# Patient Record
Sex: FEMALE | Race: BLACK OR AFRICAN AMERICAN
[De-identification: names, ages, dates, MRNs, and addresses within clinical notes are randomized per-mention and may not be internally consistent; named-entity substitution may affect disease eponyms.]

---

## 2017-01-19 NOTE — WOMENS IMAGING REPORT
EXAM DESCRIPTION:  BILAT SCREENING MAMMO W/CAD



COMPLETED DATE/TIME:  1/19/2017 11:06 am



REASON FOR STUDY:  Z12.31 ROUTINE SCREENING MAMMO Z12.31  ENCNTR SCREEN MAMMOGRAM FOR MALIGNANT NEOPL
ASM OF ALVA



COMPARISON:  Multiple since 2009



TECHNIQUE:  Standard craniocaudal and mediolateral oblique views of each breast recorded using [employer]
l acquisition.



LIMITATIONS:  None.



FINDINGS:  Findings present which are benign by mammographic criteria.  No suspicious masses, calcifi
cations or architectural distortion.

Read with the assistance of CAD.

.[facility] - R2 Cenova Version 1.3

.[facility] - R2 Cenova Version 1.3

.[facility] - R2 Cenova Version 2.4

.[facility] - R2 Cenova Version 2.4

.[facility] - R2  Version 9.2

Benign mammographic findings may include one or more of the following:  Smooth masses, popcorn/rim/co
arse calcifications, asymmetries, post-procedure changes, and lesions with long-standing stability.



BREAST DENSITY:  c. The breasts are heterogeneously dense, which may obscure small masses.



BIRAD:  2 BENIGN FINDING(S)



RECOMMENDATION:  ROUTINE SCREENING



COMMENT:  PATIENT NOTIFIED BY LETTER.

The American College of Radiology recommends an annual screening mammogram for women aged 40 years or
 over.  Each patient will receive a reminder prior to the anniversary date of her mammogram.

The American College of Radiology (ACR) has developed recommendations for screening MRI of the breast
s in certain patient populations, to be used in conjunction with mammography.  Breast MRI surveillanc
e may be appropriate for women with more than 20% lifetime risk of developing breast cancer  as deter
mined by genetic testing, significant family history of the disease, or history of mantle radiation f
or Hodgkins Disease.  ACR Practice Guidelines 2008.



TECHNICAL DOCUMENTATION:  FINDING NUMBER: (1)

ASSESSMENT: (1)

JOB ID:  [id number]

 2011 [employer]- All Rights Reserved
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
## 2017-01-21 NOTE — ER DOCUMENT REPORT
ED General





- General


Chief Complaint: Back Pain


Stated Complaint: BACK PAIN


Mode of Arrival: Ambulatory


Notes: 


Patient has 2 primary complaints today.





First, she is complaining of a lightheaded feeling for the past couple months, 

off and on.  She's not had any trouble with her balance or walking and has not 

fallen.  Has no losses of consciousness or neurologic deficits.  The 

lightheadedness is worse with head movement and like getting up out of bed.  

Denies having headache.  Denies chest pain.  Has not spoken to her primary care 

provider about this condition.





Patient also complaining of mid back pain that's been going on for a long time.

  Patient has never been told she has any aneurysms or other abnormalities of 

her aorta.  She is seeing a local back doctor/pain management ([facility]) and had 

6 injections in her upper mid back a couple of weeks ago that were very 

successful in helping the pain in that area.  However, last week, she had 4 

injections in the lower mid back area and does not feel that has helped her 

back at all.  Would like a pain medication.





Patient denies any nausea or vomiting.  Denies any UTI symptoms.  No history of 

kidney stones.  Has not had a fever.  No chest pains or irregular heartbeats.  

No shortness of breath.


TRAVEL OUTSIDE OF THE [place] IN LAST 30 DAYS: No





- Related Data


Allergies/Adverse Reactions: 


 





No Known Allergies Allergy (Verified 01/21/17 12:44)


 











Past Medical History





- General


Information source: Patient





- Social History


Smoking Status: Never Smoker


Chew tobacco use (# tins/day): No


Frequency of alcohol use: None


Drug Abuse: None


Family History: Reviewed & Not Pertinent


Patient has suicidal ideation: No


Patient has homicidal ideation: No





- Past Medical History


Cardiac Medical History: Reports: Hx Hypercholesterolemia, Hx Hypertension


   Denies: Hx Coronary Artery Disease, Hx Heart Attack


Pulmonary Medical History: 


   Denies: Hx Asthma, Hx Bronchitis, Hx COPD, Hx Pneumonia


Neurological Medical History: Denies: Hx Cerebrovascular Accident


Endocrine Medical History: Denies: Hx Diabetes Mellitus Type 1, Hx Diabetes 

Mellitus Type 2


Renal/ Medical History: Denies: Hx Peritoneal Dialysis


Musculoskeltal Medical History: Reports Hx Arthritis - Legs


Psychiatric Medical History: 


   Denies: Hx Depression


Past Surgical History: Reports: Hx Appendectomy, Hx Hysterectomy, Hx Orthopedic 

Surgery, Hx Tubal Ligation





- Immunizations


Immunizations up to date: Yes


Hx Diphtheria, Pertussis, Tetanus Vaccination: Yes


Hx Pneumococcal Vaccination: 01/01/08





Review of Systems





- Review of Systems


Notes: 


REVIEW OF SYSTEMS:


CONSTITUTIONAL :  Denies fever.  


EENT:   Denies eye, ear, nose or mouth or throat pain or other symptoms.


CARDIOVASCULAR:  Denies chest pain.


RESPIRATORY:  Denies cough, chest congestion, or shortness of breath.


GASTROINTESTINAL:  Denies abdominal pain or nausea, vomiting, or diarrhea.


GENITOURINARY:  Denies difficulty or painful urinating, urinary frequency, 

blood in urine.


MUSCULOSKELETAL: See history of present illness.


SKIN:   Denies rash or skin lesions.


NEUROLOGICAL:  Denies LOC or altered mental status.  Denies headache.  Denies 

sensory loss or motor deficits.





ALL OTHER SYSTEMS REVIEWED AND NEGATIVE.





Physical Exam





- Vital signs


Vitals: 


 











Temp Pulse Resp BP Pulse Ox


 


 98.7 F   82   20   102/64   97 


 


 01/21/17 12:44  01/21/17 12:44  01/21/17 12:44  01/21/17 12:44  01/21/17 12:44











Interpretation: Normal





- Notes


Notes: 


PHYSICAL EXAMINATION:





GENERAL: Well-appearing, in no acute distress.  Moves about easily without 

pain.  Vital signs are normal.





HEAD: Atraumatic, normocephalic.





EYES: Pupils equal round and reactive to light, extraocular movements intact.





ENT: oropharynx clear without exudates.  Moist mucous membranes.





NECK: Normal range of motion, supple.





LUNGS: Breath sounds clear and equal bilaterally.





HEART: Regular rate and rhythm without murmurs.





ABDOMEN: Soft, nontender.  No guarding or rebound.





BACK: Tender to palpation in the lower thoracic, upper lumbar back and down 

into the lumbar curvature at the pelvis.  All other areas of the back are 

nontender.





EXTREMITIES: Normal range of motion without pain.





NEUROLOGICAL:  Normal speech, normal gait.  Normal sensory, motor, and reflex 

exams.  Awake, alert, and oriented x3.  Cranial nerves normal.





PSYCH: Normal mood, normal affect.





SKIN: Warm, dry, no rashes.





Course





- Re-evaluation


Re-evalutation: 





01/21/17 20:50


Patient says her back responds very well to hydrocodone and she is out of it.





- Vital Signs


Vital signs: 


 











Temp Pulse Resp BP Pulse Ox


 


 98.7 F   82   20   102/64   97 


 


 01/21/17 12:44  01/21/17 12:44  01/21/17 12:44  01/21/17 12:44  01/21/17 12:44














- Laboratory


Result Diagrams: 


 01/21/17 13:20





 01/21/17 13:20


Laboratory results interpreted by me: 


 











  01/21/17 01/21/17





  13:20 13:20


 


WBC  14.8 H 


 


RDW  14.8 H 


 


Absolute Neutrophils  9.4 H 


 


Potassium   3.1 L


 


BUN   23 H


 


AST   47 H














Discharge





- Discharge


Clinical Impression: 


 Lightheaded





Back pain


Qualifiers:


 Back pain location: low back pain Chronicity: chronic Back pain laterality: 

midline Sciatica presence: without sciatica Qualified Code(s): M54.5 - Low back 

pain





Condition: Stable


Disposition: HOME, SELF-CARE


Additional Instructions: 


LIGHT-HEADED OR DIZZINESS:


Under normal circumstances, your sense of balance is controlled by a number of 

signals that your brain receives from several locations:


   Eyes. No matter what your position, visual signals help you determine where 

your body is in space and how it's moving.





   Sensory nerves. These are in your skin, muscles and joints. Sensory nerves 

send messages to your brain about body movements and positions.





   Inner ear. The organ of balance in your inner ear is the vestibular 

labyrinth. It includes loop-shaped structures (semicircular canals) that 

contain fluid and fine, hair-like sensors that monitor the rotation of your 

head. Near the semicircular canals are the utricle and saccule, which contain 

tiny particles called otoconia (o-toe-KOE-nee-uh). These particles are attached 

to sensors that help detect gravity and back-and-forth motion.





Good balance depends on at least two of these three sensory systems working 

well. For instance, closing your eyes while washing your hair in the shower 

doesn't mean you'll lose your balance. Signals from your inner ear and sensory 

nerves help keep you upright.


However, if your central nervous system can't process signals from all of these 

locations, if the messages are contradictory, or if the sensory systems aren't 

functioning properly, you may experience loss of balance.


Dizziness may have a number of potential causes. These may include:


Vertigo


Vertigo - the false sense of motion or spinning - is the most common symptom of 

dizziness. Sitting up or moving around may make it worse. Sometimes vertigo is 

severe enough to cause nausea and vomiting.


Vertigo usually results from a problem with the nerves and the structures of 

the balance mechanism in your inner ear (vestibular system), which sense 

movement and changes in your head position. Abnormal rhythmic eye movements (

nystagmus) almost always accompany vertigo. Causes of vertigo may include:


   Benign paroxysmal positional vertigo (BPPV). BPPV involves intense, brief 

episodes of vertigo associated with a change in the position of your head, 

often when you turn over in bed or sit up in the morning. It occurs when normal 

calcium carbonate crystals (otoconia) break loose and fall into the wrong part 

of the canals in your inner ear. When these particles shift, they stimulate 

sensors in your ear, producing an episode of vertigo. Doctors don't know what 

causes BPPV, but it may be a natural result of aging. Trauma to your head also 

may lead to BPPV.





   Inflammation in the inner ear. Signs and symptoms of inflammation of the 

inner ear (acute vestibular neuronitis or labyrinthitis) include sudden, 

intense vertigo that may persist for several days, with nausea and vomiting. It 

can be incapacitating, requiring bed rest to minimize the signs and symptoms. 

Fortunately, vestibular neuronitis generally subsides and clears up on its own. 

Recovery time may be shorter with vestibular rehabilitation exercises. Although 

the cause of this condition is unknown, it may be a viral infection.





   Meniere's disease. This disease involves the excessive buildup of fluid in 

your inner ear. It may affect adults at any age and is characterized by sudden 

episodes of vertigo lasting 30 minutes to an hour or longer. Other signs and 

symptoms include the feeling of fullness in your ear, buzzing or ringing in 

your ear (tinnitus), and fluctuating hearing loss. The cause of Meniere's 

disease is unknown.





   Vestibular migraine. People who experience a vestibular migraine are very 

sensitive to motion. Dizziness and vertigo caused by a vestibular migraine may 

be triggered by turning your head quickly, being in a crowded or confusing place

, driving or riding in a vehicle, or even watching movement on TV. A vestibular 

migraine may cause feelings of imbalance or unsteadiness, hearing loss, "muffled

" hearing, or ringing in your ears (tinnitus). For most people with a 

vestibular migraine, vertigo doesn't necessarily happen at the same time as the 

headache. Instead, typical migraine triggers may lead to vertigo without an 

actual migraine. Attacks of migrainous vertigo can last from a few minutes to 

several days.





   Acoustic neuroma. An acoustic neuroma (schwannoma) is a noncancerous (benign

) growth on the acoustic nerve, which connects the inner ear to your brain. 

Signs and symptoms of an acoustic neuroma may include dizziness, loss of balance

, hearing loss and tinnitus.





   Rapid changes in motion. Riding on roller coasters or in boats, cars or even 

airplanes may on occasion make you dizzy.





   Other causes. Rarely, vertigo can be a symptom of a more serious 

neurological problem such as a stroke, brain hemorrhage or multiple sclerosis.





Feeling of faintness (presyncope)


"Presyncope" is the medical term for feeling faint and lightheaded without 

losing consciousness. Sometimes nausea, pale skin and a sense of dizziness 

accompany a feeling of faintness. Causes of presyncope include:


   Drop in blood pressure (orthostatic hypotension). A dramatic drop in your 

systolic blood pressure - the higher number in your blood pressure reading - 

may result in lightheadedness or a feeling of faintness. It can occur after 

sitting up or standing too quickly.





   Inadequate output of blood from the heart. Conditions such as partially 

blocked arteries (atherosclerosis), disease of the heart muscle (cardiomyopathy)

, abnormal heart rhythm (arrhythmia) or a decrease in blood volume may cause 

inadequate blood flow from your heart.





Loss of balance (disequilibrium)


Disequilibrium is the loss of balance or the feeling of unsteadiness when you 

walk. Causes may include:


   Inner ear (vestibular) problems. Abnormalities with your inner ear can cause 

you to feel like you are floating, have a heavy head or are unsteady in the 

dark.





   Sensory disorders. Failing vision and nerve damage in your legs (peripheral 

neuropathy) are common in older adultsand may result in difficulty maintaining 

your balance.





   Joint and muscle problems. Muscle weakness and osteoarthritis - the type of 

arthritis that involves wear and tear of your joints - can contribute to loss 

of balance when it involves your weight-bearing joints.





   Medications. Loss of balance can be a side effect of certain medications, 

such as anti-seizure drugs, sedatives and tranquilizers.





Lightheadedness and other kinds of dizziness


Feeling lightheaded is the feeling of being "spaced out" or having the 

sensation of spinning inside your head. It can also give you the sensation that 

if your lightheadedness worsens, you might lose consciousness. Causes may 

include:


   Inner ear disorders. These abnormalities of your inner ear can lead to 

illusions of motion and make you feel like you're floating.





   Anxiety disorders. Certain anxiety disorders, such as panic attacks and a 

fear of leaving home or being in large, open spaces (agoraphobia), may cause 

lightheadedness.





   Hyperventilation. Abnormally rapid breathing that often accompanies anxiety 

disorders may make you feel lightheaded.








NORMAL EXAM AND WORKUP:


     At this time, your examination and workup show no significant abnormality.

  No significant abnormal physical findings were noted.  All laboratory, EKG, 

and imaging (x-ray, CT scans, ultrasound) studies that were ordered show no 

significant abnormality.


     Although your examination and all studies that were ordered showed no 

significant abnormal finding, there are no examinations and no studies that are 

100% accurate.  There is always the possibility that some abnormality could 

exist and not be detected with physical examination or within the limits and 

capabilities of laboratory and other studies.


     You should return or follow up as you were instructed on your visit today 

for further evaluation if your symptoms do not resolve.








FOLLOW-UP CARE:


If you have been referred to a physician for follow-up care, call the physician

s office for an appointment as you were instructed or within the next two days.

  If you experience worsening or a significant change in your symptoms, notify 

the physician immediately or return to the Emergency Department at any time for 

re-evaluation.





Follow-up with your primary care provider for further evaluation of your 

lightheadedness.








BACK PAIN:





     Three out of every four people will have an episode of disabling back pain 

during their lifetime.  Most commonly the pain is due to straining of the 

muscles and ligaments in the low back.


     Usual treatment includes: 


(1) Rest on a firm surface.  Avoid lying on your stomach.  


(2) Ice pack the painful area.  After a few days, gentle heat may be used 

intermittently to relax the area, or ice packs can be continued.  


(3) Medication may be needed -- muscle relaxers and antiinflammatory medicines 

are commonly used.  


(4) As the back improves, exercises are prescribed to strengthen the back and 

abdominal muscles.


     Your doctor will advise you on the proper care for your back at each stage 

in your recovery.  You may be better in a few days -- or healing may take 

several weeks.


     If new symptoms of a "herniated disc" (radiation of pain, numbness, or 

tingling down the back of the leg or weakness in the leg) occur, you should be 

re-examined.  Further testing may be necessary.








ORAL NARCOTIC MEDICATION:


     You have been given a prescription for pain control.  This medication is a 

narcotic.  It's best taken with food, as nausea can result if taken on an empty 

stomach.


     Don't operate machinery or drive within six hours of taking this 

medication.  Do not combine this medicine with alcohol, or with any medication 

which can cause sedation (such as cold tablets or sleeping pills) unless you 

get permission from the physician.


     Narcotics tend to cause constipation.  If possible, drink plenty of fluids 

and eat a diet high in fiber and fruits.





WARM PACKS:


     After approximately two days, apply gentle heat (such as a heating pad or 

hot water bottle) for about 20 to 30 minutes about every two hours -- at least 

four times daily.  Warmth and elevation will help you make a more rapid recovery

, and will ease the pain considerably.


     Do not use HOT heat, and never apply heat for longer than 30 minutes.  The 

continuous heat can invisibly damage skin and muscles -- even when no burn is 

seen on the surface.  Damaged muscles can make you MORE sore.








FOLLOW-UP CARE:


If you have been referred to a physician for follow-up care, call the physician

s office for an appointment as you were instructed or within the next two days.

  If you experience worsening or a significant change in your symptoms, notify 

the physician immediately or return to the Emergency Department at any time for 

re-evaluation.





Follow-up with the doctor who is seeing you for your back pain and doing the 

injections in your back.  You may need to have another set of injections for 

this pain.











Prescriptions: 


Hydrocodone/Acetaminophen [Norco 5-325 mg Tablet] 1 tab PO Q6HP PRN #15 tablet


 PRN Reason: 


Referrals: 


KAREN HARVEY MD [Primary Care Provider] - Follow up as needed

## 2017-02-15 NOTE — EKG REPORT
SEVERITY:- ABNORMAL ECG -

SINUS RHYTHM

LEFT ATRIAL ABNORMALITY

:

Confirmed by: Uyen Allen 15-Feb-2017 21:34:49
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
## 2017-02-15 NOTE — ER DOCUMENT REPORT
ED General





- General


Chief Complaint: Chest Pain


Stated Complaint: CHEST PAIN/LEFT SIDE PAIN


Notes: 


Patient is a 66-year-old female with past medical history of obesity and 

hyperlipidemia who presents with an episode of left-sided chest pain that 

occurred last night but has mostly resolved since that time.  States she's had 

similar symptoms in the past with musculoskeletal spasms.  Denies any pain at 

the time my assessment.  States that the pain was there last night it was a dull

, aching, cramping pain.  It has spontaneously resolved but she wanted to be 

sure that it was not her heart and so came to the emergency department for 

further evaluation.  She has not seeing her primary care physician regarding 

today's concerns.  Nothing improves or worsens or symptoms.  She denies any 

prior cardiac history.  Denies any dyspnea, diaphoresis, vomiting. No history 

of DVT or pulmonary embolus.


TRAVEL OUTSIDE OF THE [place] IN LAST 30 DAYS: No





- Related Data


Allergies/Adverse Reactions: 


 





No Known Allergies Allergy (Verified 01/21/17 12:44)


 











Past Medical History





- General


Information source: Patient





- Social History


Smoking Status: Never Smoker


Frequency of alcohol use: Rare


Drug Abuse: None


Lives with: Spouse/Significant other


Family History: Reviewed & Not Pertinent


Patient has suicidal ideation: No


Patient has homicidal ideation: No





- Past Medical History


Cardiac Medical History: Reports: Hx Hypercholesterolemia, Hx Hypertension


   Denies: Hx Coronary Artery Disease, Hx Heart Attack


Pulmonary Medical History: 


   Denies: Hx Asthma, Hx Bronchitis, Hx COPD, Hx Pneumonia


Neurological Medical History: Denies: Hx Cerebrovascular Accident


Endocrine Medical History: Denies: Hx Diabetes Mellitus Type 1, Hx Diabetes 

Mellitus Type 2


Renal/ Medical History: Denies: Hx Peritoneal Dialysis


Musculoskeltal Medical History: Reports Hx Arthritis - Legs


Psychiatric Medical History: 


   Denies: Hx Depression


Past Surgical History: Reports: Hx Appendectomy, Hx Hysterectomy, Hx Orthopedic 

Surgery, Hx Tubal Ligation





- Immunizations


Immunizations up to date: Yes


Hx Diphtheria, Pertussis, Tetanus Vaccination: Yes


Hx Pneumococcal Vaccination: 01/01/16





Review of Systems





- Review of Systems


Notes: 


Constitutional: Negative for fever.


HENT: Negative for sore throat.


Eyes: Negative for visual changes.


Cardiovascular: Negative for chest pain.


Respiratory: Negative for shortness of breath.


Gastrointestinal: Negative for abdominal pain, vomiting or diarrhea.


Genitourinary: Negative for dysuria.


Musculoskeletal: Positive for chest wall discomfort


Skin: Negative for rash.


Neurological: Negative for headaches, weakness or numbness.





10 point ROS negative except as marked above and in HPI.





Physical Exam





- Vital signs


Vitals: 


 











Temp Pulse Resp BP Pulse Ox


 


 98.4 F   82   16   123/62   97 


 


 02/15/17 19:43  02/15/17 19:43  02/15/17 19:43  02/15/17 19:43  02/15/17 19:43











Interpretation: Normal


Notes: 


PHYSICAL EXAMINATION:





GENERAL: Well-appearing, well-nourished and in no acute distress.





HEAD: Atraumatic, normocephalic.





EYES: Pupils equal round and reactive to light, extraocular movements intact, 

sclera anicteric, conjunctiva are normal.





ENT: nares patent, oropharynx clear without exudates.  Moist mucous membranes.





NECK: Normal range of motion, supple without lymphadenopathy





LUNGS: Breath sounds clear to auscultation bilaterally and equal.  No wheezes 

rales or rhonchi.





HEART: Regular rate and rhythm without murmurs





ABDOMEN: Soft, nontender, normoactive bowel sounds.  No guarding, no rebound.  

No masses appreciated.





EXTREMITIES: Normal range of motion, no pitting or edema.  No cyanosis.  Pain 

on elevation of the lateral most aspect of the left sub-clavicle region





NEUROLOGICAL: No focal neurological deficits. Moves all extremities 

spontaneously and on command.





PSYCH: Normal mood, normal affect.





SKIN: Warm, Dry, normal turgor, no rashes or lesions noted.





Course





- Re-evaluation


Re-evalutation: 





02/15/17 23:02


Presentation of chest pain in an otherwise well appearing patient. Low clinical 

suspicion for ACS given clinical history, exam, EKG without ST elevations or 

depressions, and negative initial troponin. No indication for serial troponin 

as patient had her episode of pain last night and has been pain free since that 

time. HEART score less than or equal to 3. PE also seems unlikely given 

clinical history, absence of tachycardia or dyspnea.  Well's score 0. CXR 

without evidence of pneumothorax or pneumonia. No widened mediastinum. Aortic 

dissection also seems unlikely given history, symmetric pulses, CXR, and 

vitals. Given reassuring evaluation, will plan for discharge home at this time 

with return precautions and follow-up recommendations.





- Vital Signs


Vital signs: 


 











Temp Pulse Resp BP Pulse Ox


 


 98.4 F   79   16   123/71   96 


 


 02/15/17 19:43  02/15/17 23:12  02/15/17 23:12  02/15/17 23:12  02/15/17 23:12














- Laboratory


Result Diagrams: 


 02/15/17 19:55





 02/15/17 19:55


Laboratory results interpreted by me: 


 











  02/15/17 02/15/17





  19:55 19:55


 


Hgb  11.7 L 


 


Hct  35.0 L 


 


RDW  14.2 H 


 


Potassium   3.2 L


 


Creatine Kinase   186 H














- Diagnostic Test


Radiology reviewed: Image reviewed, Reports reviewed


Radiology results interpreted by me: 





02/15/17 23:03


Chest x-ray: No acute infiltrate





- EKG Interpretation by Me


Additional EKG results interpreted by me: 





02/15/17 23:03


Normal sinus rhythm.  Rate 81.  No ST elevations or depressions.  QTC is 465.





Discharge





- Discharge


Clinical Impression: 


 Chest wall pain





Condition: Good


Disposition: HOME, SELF-CARE


Additional Instructions: 


You were seen today for chest pain.  The exact cause of your pain is unclear. 

However, based on your cardiac enzyme testing, chest x-ray, and EKG it does not 

appear that it is from an immediately life-threatening cause at this time.  

Although your testing here is normal is critical that you follow-up with your 

primary care physician for continued evaluation of this chest pain and possible 

stress testing.  I recommended you see your physician within the next 24-48 

hours to be evaluated for consideration of a stress test.  Please return to 

emergency department immediately if you have worsening of your chest pain, 

shortness of breath, vomiting, become unable to exert yourself due to pain or 

difficulty breathing, you pass out, or have any pain that radiates into your 

arms, jaw, or back. Please also return if you have any additional symptoms that 

are concerning to you.


Referrals: 


KAREN HARVEY MD [Primary Care Provider] - Follow up as needed

## 2018-03-29 ENCOUNTER — HOSPITAL ENCOUNTER (OUTPATIENT)
Dept: HOSPITAL 62 - OD | Age: 68
End: 2018-03-29
Attending: FAMILY MEDICINE
Payer: MEDICARE

## 2018-03-29 DIAGNOSIS — M25.50: Primary | ICD-10-CM

## 2018-03-29 PROCEDURE — 86430 RHEUMATOID FACTOR TEST QUAL: CPT

## 2018-03-29 PROCEDURE — 86140 C-REACTIVE PROTEIN: CPT

## 2018-03-29 PROCEDURE — 36415 COLL VENOUS BLD VENIPUNCTURE: CPT

## 2018-03-29 PROCEDURE — 86038 ANTINUCLEAR ANTIBODIES: CPT

## 2018-03-29 PROCEDURE — 85652 RBC SED RATE AUTOMATED: CPT

## 2018-04-24 ENCOUNTER — HOSPITAL ENCOUNTER (OUTPATIENT)
Dept: HOSPITAL 62 - WI | Age: 68
End: 2018-04-24
Payer: MEDICARE

## 2018-04-24 DIAGNOSIS — Z12.31: Primary | ICD-10-CM

## 2018-04-24 PROCEDURE — 77067 SCR MAMMO BI INCL CAD: CPT

## 2018-04-24 PROCEDURE — 77063 BREAST TOMOSYNTHESIS BI: CPT

## 2018-04-24 NOTE — WOMENS IMAGING REPORT
EXAM DESCRIPTION:  3D SCREENING MAMMO BILAT



COMPLETED DATE/TIME:  4/24/2018 12:01 pm



REASON FOR STUDY:  ROUTINE SCREENING;Z12.31 Z12.31  ENCNTR SCREEN MAMMOGRAM FOR MALIGNANT NEOPLASM OF
 ALVA



COMPARISON:  [id number]



TECHNIQUE:  Standard craniocaudal and mediolateral oblique views of each breast recorded using digita
l acquisition and breast tomosynthesis.



LIMITATIONS:  None.



FINDINGS:  No masses, calcifications or architectural distortion. No areas of suspicion.

Read with the assistance of CAD.

.[facility] - R2 Cenova Version 1.3

.[facility] - R2 Cenova Version 1.3

.[facility] - R2 Cenova Version 2.4

[place] [place] Version 2.4

.[facility] - R2  Version 9.2



IMPRESSION:  NORMAL MAMMOGRAM.  BIRADS 1.



BREAST DENSITY:  b. There are scattered areas of fibroglandular density.



BIRAD:  1 NEGATIVE



RECOMMENDATION:  ROUTINE SCREENING



COMMENT:  The patient has been notified of the results by letter per SA requirements. Additional no
tification policies are in place for contacting patient with suspicious or incomplete findings.

Quality ID #225: The American College of Radiology recommends an annual screening mammogram for women
 aged 40 years or over. This facility utilizes a reminder system to ensure that all patients receive 
reminder letters, and/or direct phone calls for appointments. This includes reminders for routine scr
eening mammograms, diagnostic mammograms, or other Breast Imaging Interventions when appropriate.  Th
is patient will be placed in the appropriate reminder system.

The American College of Radiology (ACR) has developed recommendations for screening MRI of the breast
s in certain patient populations, to be used in conjunction with mammography.  Breast MRI surveillanc
e may be appropriate for women with more than 20% lifetime risk of developing breast cancer  as deter
mined by genetic testing, significant family history of the disease, or history of mantle radiation f
or Hodgkins Disease.  ACR Practice Guidelines 2008.

[employer]



PQRS 6045F: Fluoroscopic imaging is not utilized for breast tomosynthesis.



TECHNICAL DOCUMENTATION:  FINDING NUMBER: (1)

ASSESSMENT:  (1)

JOB ID:  [id number]

 2011 [employer]- All Rights Reserved



Reading location - IP/workstation name: [email or web address]

## 2018-11-02 ENCOUNTER — HOSPITAL ENCOUNTER (OUTPATIENT)
Dept: HOSPITAL 62 - OD | Age: 68
End: 2018-11-02
Attending: INTERNAL MEDICINE
Payer: MEDICARE

## 2018-11-02 DIAGNOSIS — E78.5: Primary | ICD-10-CM

## 2018-11-06 ENCOUNTER — HOSPITAL ENCOUNTER (OUTPATIENT)
Dept: HOSPITAL 62 - OD | Age: 68
End: 2018-11-06
Attending: INTERNAL MEDICINE
Payer: MEDICARE

## 2018-11-06 DIAGNOSIS — E78.5: Primary | ICD-10-CM

## 2018-11-06 LAB
ALBUMIN SERPL-MCNC: 4.3 G/DL (ref 3.5–5)
ALP SERPL-CCNC: 72 U/L (ref 38–126)
ALT SERPL-CCNC: 17 U/L (ref 9–52)
ANION GAP SERPL CALC-SCNC: 13 MMOL/L (ref 5–19)
AST SERPL-CCNC: 30 U/L (ref 14–36)
BILIRUB DIRECT SERPL-MCNC: 0.1 MG/DL (ref 0–0.4)
BILIRUB SERPL-MCNC: 0.3 MG/DL (ref 0.2–1.3)
BUN SERPL-MCNC: 11 MG/DL (ref 7–20)
CALCIUM: 9.7 MG/DL (ref 8.4–10.2)
CHLORIDE SERPL-SCNC: 101 MMOL/L (ref 98–107)
CO2 SERPL-SCNC: 29 MMOL/L (ref 22–30)
GLUCOSE SERPL-MCNC: 101 MG/DL (ref 75–110)
POTASSIUM SERPL-SCNC: 3.5 MMOL/L (ref 3.6–5)
PROT SERPL-MCNC: 7.7 G/DL (ref 6.3–8.2)
SODIUM SERPL-SCNC: 142.5 MMOL/L (ref 137–145)

## 2018-11-06 PROCEDURE — 36415 COLL VENOUS BLD VENIPUNCTURE: CPT

## 2018-11-06 PROCEDURE — 80053 COMPREHEN METABOLIC PANEL: CPT

## 2018-12-07 ENCOUNTER — HOSPITAL ENCOUNTER (OUTPATIENT)
Dept: HOSPITAL 62 - OD | Age: 68
End: 2018-12-07
Attending: INTERNAL MEDICINE
Payer: MEDICARE

## 2018-12-07 DIAGNOSIS — I10: Primary | ICD-10-CM

## 2018-12-07 LAB
ANION GAP SERPL CALC-SCNC: 14 MMOL/L (ref 5–19)
BUN SERPL-MCNC: 11 MG/DL (ref 7–20)
CALCIUM: 10 MG/DL (ref 8.4–10.2)
CHLORIDE SERPL-SCNC: 95 MMOL/L (ref 98–107)
CO2 SERPL-SCNC: 34 MMOL/L (ref 22–30)
GLUCOSE SERPL-MCNC: 130 MG/DL (ref 75–110)
POTASSIUM SERPL-SCNC: 2.9 MMOL/L (ref 3.6–5)
SODIUM SERPL-SCNC: 143.1 MMOL/L (ref 137–145)

## 2018-12-07 PROCEDURE — 80048 BASIC METABOLIC PNL TOTAL CA: CPT

## 2018-12-07 PROCEDURE — 36415 COLL VENOUS BLD VENIPUNCTURE: CPT

## 2019-01-29 ENCOUNTER — HOSPITAL ENCOUNTER (OUTPATIENT)
Dept: HOSPITAL 62 - OD | Age: 69
End: 2019-01-29
Attending: INTERNAL MEDICINE
Payer: MEDICARE

## 2019-01-29 DIAGNOSIS — R73.9: Primary | ICD-10-CM

## 2019-01-29 LAB — FASTING GAC: 129 (ref ?–110)

## 2019-01-29 PROCEDURE — 82951 GLUCOSE TOLERANCE TEST (GTT): CPT

## 2019-01-29 PROCEDURE — 36415 COLL VENOUS BLD VENIPUNCTURE: CPT

## 2019-11-20 ENCOUNTER — HOSPITAL ENCOUNTER (EMERGENCY)
Dept: HOSPITAL 62 - ER | Age: 69
Discharge: HOME | End: 2019-11-20
Payer: MEDICARE

## 2019-11-20 VITALS — DIASTOLIC BLOOD PRESSURE: 62 MMHG | SYSTOLIC BLOOD PRESSURE: 116 MMHG
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
## 2019-11-20 DIAGNOSIS — M54.9: ICD-10-CM

## 2019-11-20 DIAGNOSIS — S09.90XA: ICD-10-CM

## 2019-11-20 DIAGNOSIS — I10: ICD-10-CM

## 2019-11-20 DIAGNOSIS — S29.012A: Primary | ICD-10-CM

## 2019-11-20 DIAGNOSIS — Z87.891: ICD-10-CM
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
## 2019-11-20 DIAGNOSIS — R51: ICD-10-CM

## 2019-11-20 PROCEDURE — 72110 X-RAY EXAM L-2 SPINE 4/>VWS: CPT

## 2019-11-20 PROCEDURE — 70450 CT HEAD/BRAIN W/O DYE: CPT

## 2019-11-20 PROCEDURE — 99284 EMERGENCY DEPT VISIT MOD MDM: CPT

## 2019-11-20 PROCEDURE — 72070 X-RAY EXAM THORAC SPINE 2VWS: CPT

## 2019-11-20 NOTE — RADIOLOGY REPORT (SQ)
EXAM DESCRIPTION:  L SPINE WHOLE



COMPLETED DATE/TIME:  11/20/2019 5:12 pm



REASON FOR STUDY:  fall



COMPARISON:  Lumbar spine five views 3/7/2013



NUMBER OF VIEWS:  Five views including obliques.



TECHNIQUE:  AP, lateral, oblique, and sacral radiographic images acquired of the lumbar spine.



LIMITATIONS:  None.



FINDINGS:  MINERALIZATION: Normal.

SEGMENTATION: 6 lumbar vertebral bodies.

ALIGNMENT: Normal.

VERTEBRAE: Maintained height.  No fracture or worrisome bone lesion.

DISCS: Preserved height.  No significant osteophytes or end plate irregularity.

POSTERIOR ELEMENTS: Pedicles and facets are intact.  No pars defect or posterior arch defects.  Lower
 lumbar facet arthropathy

HARDWARE: None in the spine.

PARASPINAL SOFT TISSUES: Normal.

PELVIS: Bilateral SI joint sclerosis

OTHER: No other significant finding.



IMPRESSION:  No acute findings.



TECHNICAL DOCUMENTATION:  JOB ID:  [id number]

 2011 [employer]- All Rights Reserved



Reading location - IP/workstation name: [email or web address]

## 2019-11-20 NOTE — ER DOCUMENT REPORT
Entered by VERONICA KRAUS SCRIBE  11/20/19 1804 





Acting as scribe for:FRANCES NUR DO





ED Fall





- General


Chief Complaint: Fall


Stated Complaint: FALL/BODY PAIN


Time Seen by Provider: 11/20/19 16:28


Primary Care Provider: 


KAREN HARVEY MD [Primary Care Provider] - Follow up tomorrow


Mode of Arrival: Ambulatory


Information source: Patient


Notes: 





This 68-year-old female patient presents to the emergency department today with 

complaints of back pain.  Patient states about 5 days ago she tripped over a 

concrete divider and fell backwards onto on the pavement. Patient has had low 

back pain ever since. Patient did not lose consciousness. Patient has been able 

to walk since this without any difficulty.


TRAVEL OUTSIDE OF THE [place] IN LAST 30 DAYS: No





- Related data


Allergies/Adverse Reactions: 


                                        





No Known Allergies Allergy (Verified 11/20/19 16:25)


   











Past Medical History





- General


Information source: Patient





- Social History


Smoking Status: Former Smoker


Cigarette use (# per day): No


Chew tobacco use (# tins/day): No


Frequency of alcohol use: Occasional


Drug Abuse: None


Family History: Reviewed & Not Pertinent


Patient has suicidal ideation: No


Patient has homicidal ideation: No





- Past Medical History


Cardiac Medical History: Reports: Hx Hypercholesterolemia, Hx Hypertension


Musculoskeletal Medical History: Reports Hx Arthritis - Legs


Past Surgical History: Reports: Hx Appendectomy, Hx Hysterectomy, Hx Orthopedic 

Surgery, Hx Tubal Ligation





- Immunizations


Immunizations up to date: Yes


Hx Diphtheria, Pertussis, Tetanus Vaccination: Yes


Hx Pneumococcal Vaccination: 01/01/16





Review of Systems





- Review of Systems


Constitutional: No symptoms reported


EENT: No symptoms reported


Cardiovascular: No symptoms reported


Respiratory: No symptoms reported


Gastrointestinal: No symptoms reported


Genitourinary: No symptoms reported


Female Genitourinary: No symptoms reported


Musculoskeletal: See HPI, Back pain


Skin: No symptoms reported


Hematologic/Lymphatic: No symptoms reported


Neurological/Psychological: No symptoms reported


-: Yes All other systems reviewed and negative





Physical Exam





- Vital signs


Vitals: 


                                        











Temp Pulse Resp BP Pulse Ox


 


 98.2 F   100   18   127/70 H  95 


 


 11/20/19 15:55  11/20/19 15:55  11/20/19 15:55  11/20/19 15:55  11/20/19 15:55











Interpretation: Normal





- General


General appearance: Appears well, Alert





- HEENT


Head: Normocephalic, Atraumatic


Eyes: Normal


Pupils: PERRL


Neck: Other - Mild left sided trapezius tenderness to palpation in the base of 

the neck.  No midline tenderness





- Respiratory


Respiratory status: No respiratory distress


Chest status: Nontender


Breath sounds: Normal


Chest palpation: Normal





- Cardiovascular


Rhythm: Regular


Heart sounds: Normal auscultation


Murmur: No





- Abdominal


Inspection: Normal


Distension: No distension


Bowel sounds: Normal


Tenderness: Nontender


Organomegaly: No organomegaly





- Back


Back: Normal, Tender - Mild tenderness to palpation paraspinal muscles midline 

back and over sacrum.





- Extremities


General upper extremity: Normal inspection, Nontender, Normal color, Normal ROM,

Normal temperature


General lower extremity: Normal inspection, Nontender, Normal color, Normal ROM,

Normal temperature, Normal weight bearing.  No: Praveena's sign





- Neurological


Neuro grossly intact: Yes


Cognition: Normal


Orientation: AAOx4


Mitesh Coma Scale Eye Opening: Spontaneous


Mitesh Coma Scale Verbal: Oriented


Dulzura Coma Scale Motor: Obeys Commands


Mitesh Coma Scale Total: 15


Speech: Normal


Motor strength normal: LUE, RUE, LLE, RLE


Sensory: Normal





- Psychological


Associated symptoms: Normal affect, Normal mood





- Skin


Skin Temperature: Warm


Skin Moisture: Dry


Skin Color: Normal





Course





- Re-evaluation


Re-evalutation: 





11/20/19


Patient is a 68-year-old female who fell comes in for headache and back pain.  

CT head within normal limits.  No acute findings on x-ray.  Patient is 

neurovascularly intact.  She will be discharged home with Lidoderm and is to 

follow-up with her doctor.  Stable for discharge.  Return if any worsening or 

concerning symptoms.  Understands and agrees with plan.








- Vital Signs


Vital signs: 


                                        











Temp Pulse Resp BP Pulse Ox


 


 97.9 F   90   16   116/62   95 


 


 11/20/19 18:36  11/20/19 18:36  11/20/19 18:36  11/20/19 18:36  11/20/19 18:36














- Diagnostic Test


Radiology reviewed: Reports reviewed





Discharge





- Discharge


Clinical Impression: 


Fall


Qualifiers:


 Encounter type: initial encounter Qualified Code(s): W19.XXXA - Unspecified 

fall, initial encounter





Closed head injury


Qualifiers:


 Encounter type: initial encounter Qualified Code(s): S09.90XA - Unspecified 

injury of head, initial encounter





Upper back strain


Qualifiers:


 Encounter type: initial encounter Qualified Code(s): S29.012A - Strain of 

muscle and tendon of back wall of thorax, initial encounter





Back contusion


Qualifiers:


 Encounter type: initial encounter Laterality: unspecified laterality Qualified 

Code(s): S20.229A - Contusion of unspecified back wall of thorax, initial 

encounter





Condition: Stable


Disposition: HOME, SELF-CARE


Instructions:  Contusion ([facility]), Head Injury Precautions ([facility]), Upper Back Strain

([facility])


Additional Instructions: 


Please use a cane or walker until you feel like your balance is back to normal.


Prescriptions: 


Lidocaine [Lidoderm 5% (700 mg) Transdermal Patch] 1 patch TP DAILY #14 

adh..patch


Referrals: 


KAREN HARVEY MD [Primary Care Provider] - Follow up tomorrow





I personally performed the services described in the documentation, reviewed and

edited the documentation which was dictated to the scribe in my presence, and it

accurately records my words and actions.

## 2019-11-20 NOTE — RADIOLOGY REPORT (SQ)
EXAM DESCRIPTION:  CT HEAD WITHOUT



COMPLETED DATE/TIME:  11/20/2019 5:01 pm



REASON FOR STUDY:  fall



COMPARISON:  5/1/2016



TECHNIQUE:  Axial images acquired through the brain without intravenous contrast. Images reviewed wit
h bone, brain and subdural windows.  Images stored on PACS.

All CT scanners at this facility use dose modulation, iterative reconstruction, and/or weight based d
osing when appropriate to reduce radiation dose to as low as reasonably achievable (ALARA).

[facility]: Dose Right  [facility]: CareDose    [facility]: Dose Right    [facility]: Teradose 4D    [facility]: Smart Lucidity (Member[employer])



RADIATION DOSE:  CT Rad equipment meets quality standard of care and radiation dose reduction techniq
ues were employed. CTDIvol: 53.2 mGy. DLP: 1070 mGy-cm..



LIMITATIONS:  None.



FINDINGS:  VENTRICLES: Normal size and contour.

CEREBRUM: No masses. No hemorrhage. No midline shift. Age appropriate white matter. No evidence for a
cute infarction.

CEREBELLUM: No masses. No hemorrhage. No alteration of density. No evidence for acute infarction.

EXTRA-AXIAL SPACES: No fluid collections.

ORBITS AND GLOBE: No intra- or extraconal masses. Normal contour of globe without masses.

CALVARIUM: No fracture.

PARANASAL SINUSES: No fluid or mucosal thickening.

SOFT TISSUES: No mass or hematoma.

OTHER: No other significant finding.



IMPRESSION:  NO ACUTE INTRACRANIAL FINDINGS.

EVIDENCE OF ACUTE STROKE: NO.



TECHNICAL DOCUMENTATION:  JOB ID:  [id number]

[id number]

Quality ID # 436: Final reports with documentation of one or more dose reduction techniques (e.g., Au
tomated exposure control, adjustment of the mA and/or kV according to patient size, use of iterative 
reconstruction technique)

 2011 [employer]- All Rights Reserved



Reading location - IP/workstation name: [email or web address]

## 2019-11-20 NOTE — RADIOLOGY REPORT (SQ)
EXAM DESCRIPTION:  T SPINE AP/LAT



COMPLETED DATE/TIME:  11/20/2019 5:12 pm



REASON FOR STUDY:  fall



COMPARISON:  Lumbar spine films same date



NUMBER OF VIEWS:  Two views.



TECHNIQUE:  AP and lateral radiographic images acquired of the thoracic spine.



LIMITATIONS:  None.



FINDINGS:  MINERALIZATION: Normal.

ALIGNMENT: Normal.  No scoliosis.

VERTEBRAE: No fracture or bone lesion.  Maintained height, normal segmentation.

DISCS: No significant loss of height or significant narrowing.  No large osteophytes.

HARDWARE: None in the spine.

MEDIASTINUM AND SOFT TISSUES: Normal heart size and aortic contour.  No soft tissue abnormality.

VISUALIZED LUNG FIELDS: Clear.

OTHER: No other significant finding.



IMPRESSION:  No acute findings



TECHNICAL DOCUMENTATION:  JOB ID:  [id number]

 2011 [employer]- All Rights Reserved



Reading location - IP/workstation name: [email or web address]

## 2019-11-20 NOTE — ER DOCUMENT REPORT
ED Medical Screen (RME)





- General


Chief Complaint: Fall


Stated Complaint: FALL/BODY PAIN


Time Seen by Provider: 11/20/19 16:28


Primary Care Provider: 


KAREN HARVEY MD [Primary Care Provider] - Follow up as needed


Notes: 





Patient is a 68-year-old female who presents the emergency department after 

fall.  Patient states that she fell this past Saturday.  Patient states that she

was getting groceries and was in the parking lot.  She ended up tripping over a 

cement parking space block and fell down.  She denies any loss of consciousness.

 She is not sure if she had hit her head.  Patient states that she takes hyd

rocodone at home and it helps a little bit with her pain, but states that she 

continues to hurt.  Has complaints of entire back pain.  Denies any numbness or 

tingling.





Exam: Patient moves all extremities with no difficulty.  Strength 5 out of 5.





I have greeted and performed a rapid initial assessment of this patient.  A 

comprehensive ED assessment and evaluation of the patient, analysis of test 

results and completion of medical decision making process will be conducted by 

an additional ED providers.





- Related Data


Allergies/Adverse Reactions: 


                                        





No Known Allergies Allergy (Verified 11/20/19 16:25)


   











Past Medical History





- Social History


Frequency of alcohol use: Occasional


Drug Abuse: None





- Past Medical History


Cardiac Medical History: Reports: Hx Hypercholesterolemia, Hx Hypertension


   Denies: Hx Coronary Artery Disease, Hx Heart Attack


Pulmonary Medical History: 


   Denies: Hx Asthma, Hx Bronchitis, Hx COPD, Hx Pneumonia


Neurological Medical History: Denies: Hx Cerebrovascular Accident


Endocrine Medical History: Denies: Hx Diabetes Mellitus Type 1, Hx Diabetes 

Mellitus Type 2


Renal/ Medical History: Denies: Hx Peritoneal Dialysis


Musculoskeltal Medical History: Reports Hx Arthritis - Legs


Psychiatric Medical History: 


   Denies: Hx Depression


Past Surgical History: Reports: Hx Appendectomy, Hx Hysterectomy, Hx Orthopedic 

Surgery, Hx Tubal Ligation





- Immunizations


Immunizations up to date: Yes


Hx Diphtheria, Pertussis, Tetanus Vaccination: Yes





Physical Exam





- Vital signs


Vitals: 





                                        











Temp Pulse Resp BP Pulse Ox


 


 98.2 F   100   18   127/70 H  95 


 


 11/20/19 15:55  11/20/19 15:55  11/20/19 15:55  11/20/19 15:55  11/20/19 15:55














Course





- Vital Signs


Vital signs: 





                                        











Temp Pulse Resp BP Pulse Ox


 


 98.2 F   100   18   127/70 H  95 


 


 11/20/19 15:55  11/20/19 15:55  11/20/19 15:55  11/20/19 15:55  11/20/19 15:55














Doctor's Discharge





- Discharge


Referrals: 


KAREN HARVEY MD [Primary Care Provider] - Follow up as needed

## 2020-01-14 ENCOUNTER — HOSPITAL ENCOUNTER (OUTPATIENT)
Dept: HOSPITAL 62 - WI | Age: 70
End: 2020-01-14
Attending: INTERNAL MEDICINE
Payer: MEDICARE

## 2020-01-14 DIAGNOSIS — Z12.31: Primary | ICD-10-CM

## 2020-01-14 PROCEDURE — 77067 SCR MAMMO BI INCL CAD: CPT

## 2020-01-14 PROCEDURE — 77063 BREAST TOMOSYNTHESIS BI: CPT

## 2020-01-15 NOTE — WOMENS IMAGING REPORT
EXAM DESCRIPTION:  3D SCREENING MAMMO BILAT



COMPLETED DATE/TIME:  1/14/2020 1:57 pm



REASON FOR STUDY:  Z12.31 ENCOUNTER FOR SCREENING MAMMOGRAM FOR MALIGNANT NEOPLASM OF BREAST Z12.31  
ENCNTR SCREEN MAMMOGRAM FOR MALIGNANT NEOPLASM OF ALVA



COMPARISON:  Multiple since 2009



EXAM PARAMETERS:  Views: Standard craniocaudal and mediolateral oblique views of each breast recorded
 using digital acquisition and breast tomosynthesis.

Read with the assistance of CAD.

.[facility] - swabr  Version 9.2



LIMITATIONS:  None.



FINDINGS:  No suspicious masses, suspicious calcifications or architectural distortion. No areas of c
oncern.



IMPRESSION:   NEGATIVE MAMMOGRAM. BIRADS 1.



BREAST DENSITY:  b. There are scattered areas of fibroglandular density.



BIRAD:  ASSESSMENT:  1 NEGATIVE



RECOMMENDATION:  ROUTINE SCREENING

Please continue yearly bilateral screening mammography/tomosynthesis in January 2021



COMMENT:  The patient has been notified of the results by letter per MQSA requirements. Additional no
tification policies are in place for contacting patient with suspicious or incomplete findings.

Quality ID #225: The American College of Radiology recommends an annual screening mammogram for women
 aged 40 years or over. This facility utilizes a reminder system to ensure that all patients receive 
reminder letters, and/or direct phone calls for appointments. This includes reminders for routine scr
eening mammograms, diagnostic mammograms, or other Breast Imaging Interventions when appropriate.  Th
is patient will be placed in the appropriate reminder system.



TECHNICAL DOCUMENTATION:  FINDING NUMBER: (1)

ASSESSMENT:  (1)

JOB ID:  [id number]

 2011 [employer]- All Rights Reserved



Reading location - IP/workstation name: [email or web address]

## 2020-02-08 NOTE — ER DOCUMENT REPORT
HPI





- HPI


Time Seen by Provider: 02/08/20 16:44


Pain Level: Denies


Notes: 








69-year-old female patient presents emergency department with concern for 

possible skin rash.  Patient reports that she just noticed that she has some red

spots on her right leg, left thigh and right side of her neck.  Patient thinks 

she may have been bitten by something although she denies recalling this 

happening.  She denies any pain just states the areas are itchy.  Denies any new

products.








- REPRODUCTIVE


Reproductive: DENIES: Pregnant:





Past Medical History





- General


Information source: Patient





- Social History


Smoking Status: Never Smoker


Frequency of alcohol use: None


Drug Abuse: None


Family History: Reviewed & Not Pertinent


Patient has suicidal ideation: No


Patient has homicidal ideation: No





- Past Medical History


Cardiac Medical History: Reports: Hx Hypercholesterolemia, Hx Hypertension


   Denies: Hx Coronary Artery Disease, Hx Heart Attack


Pulmonary Medical History: 


   Denies: Hx Asthma, Hx Bronchitis, Hx COPD, Hx Pneumonia


Neurological Medical History: Denies: Hx Cerebrovascular Accident


Endocrine Medical History: Denies: Hx Diabetes Mellitus Type 1, Hx Diabetes 

Mellitus Type 2


Renal/ Medical History: Denies: Hx Peritoneal Dialysis


Musculoskeletal Medical History: Reports Hx Arthritis - Legs


Psychiatric Medical History: 


   Denies: Hx Depression


Past Surgical History: Reports: Hx Appendectomy, Hx Hysterectomy, Hx Orthopedic 

Surgery, Hx Tubal Ligation





- Immunizations


Immunizations up to date: Yes


Hx Diphtheria, Pertussis, Tetanus Vaccination: Yes


Hx Pneumococcal Vaccination: 01/01/16





Vertical Provider Document





- CONSTITUTIONAL


Notes: 





PHYSICAL EXAMINATION:





GENERAL: Well-appearing, well-nourished and in no acute distress.





HEAD: Atraumatic, normocephalic.





EYES: Pupils equal round extraocular movements intact,  conjunctiva are normal.





ENT: Nares patent





NECK: Normal range of motion





LUNGS: No respiratory distress





Musculoskeletal: Normal range of motion





NEUROLOGICAL:  Normal speech, normal gait. 





PSYCH: Normal mood, normal affect.





SKIN: Scattered areas of erythema, consistent with insect bites.  No obvious 

cellulitis.





- INFECTION CONTROL


TRAVEL OUTSIDE OF THE [place] IN LAST 30 DAYS: No





Course





- Re-evaluation


Re-evalutation: 





Patient appears well, nontoxic.  Patient with likely insect bites.  Patient will

be treated with Bactroban ointment.  She will also be encouraged to take 

Benadryl for the itching.  Patient will follow-up with her primary care provider

for follow-up if not improving.





- Vital Signs


Vital signs: 


                                        











Temp Pulse Resp BP Pulse Ox


 


 98.9 F   84   20   106/55 L  96 


 


 02/08/20 16:26  02/08/20 16:26  02/08/20 16:26  02/08/20 16:26  02/08/20 16:26














Discharge





- Discharge


Clinical Impression: 


Insect bite


Qualifiers:


 Encounter type: initial encounter Site of insect bite: unspecified site 

Qualified Code(s): W57.XXXA - Bitten or stung by nonvenomous insect and other 

nonvenomous arthropods, initial encounter





Condition: Stable


Disposition: HOME, SELF-CARE


Additional Instructions: 


Please take medication as prescribed.  Apply the ointment to the affected areas 

3 times daily.  Take Benadryl 50 mg every 6 hours.  Avoid hot showers or baths 

as this may flare up the area.  Follow-up with your primary care provider Monday

or Tuesday if not improving.





Prescriptions: 


Prednisone [Deltasone 20 mg Tablet] 3 tab PO DAILY 5 Days #15 tablet


Referrals: 


KAREN HARVEY MD [Primary Care Provider] - Follow up as needed

## 2020-03-14 NOTE — ER DOCUMENT REPORT
ED Medical Screen (RME)





- General


Chief Complaint: Hand Swelling


Stated Complaint: SWOLLEN FINGER


Primary Care Provider: 


KAREN HARVEY MD [Primary Care Provider] - Follow up as needed


Notes: 





69-year-old female presents for ring stuck on finger.  Patient states she has 

been wearing the string for over a month and states it started swelling 

yesterday.





Swollen finger seen with ring.  Narcotic pain medication ordered.





I have greeted and performed a rapid initial assessment of this patient. A 

comprehensive ED assessment and evaluation of the patient, analysis of test 

results and completion of the medical decision making process with be conducted 

by additional ED providers.


TRAVEL OUTSIDE OF THE [place] IN LAST 30 DAYS: No





- Related Data


Allergies/Adverse Reactions: 


                                        





No Known Allergies Allergy (Verified 11/20/19 16:25)


   








Home Medications: amilodipine.  losartan.  low dose asa





Past Medical History





- Past Medical History


Cardiac Medical History: Reports: Hx Hypercholesterolemia, Hx Hypertension


   Denies: Hx Coronary Artery Disease, Hx Heart Attack


Pulmonary Medical History: 


   Denies: Hx Asthma, Hx Bronchitis, Hx COPD, Hx Pneumonia


Neurological Medical History: Denies: Hx Cerebrovascular Accident


Endocrine Medical History: Denies: Hx Diabetes Mellitus Type 1, Hx Diabetes 

Mellitus Type 2


Renal/ Medical History: Denies: Hx Peritoneal Dialysis


Musculoskeltal Medical History: Reports Hx Arthritis - Legs


Psychiatric Medical History: 


   Denies: Hx Depression


Past Surgical History: Reports: Hx Appendectomy, Hx Hysterectomy, Hx Orthopedic 

Surgery, Hx Tubal Ligation





- Immunizations


Immunizations up to date: Yes


Hx Diphtheria, Pertussis, Tetanus Vaccination: Yes





Physical Exam





- Vital signs


Vitals: 





                                        











Temp Pulse Resp BP Pulse Ox


 


 97.9 F   80   18   109/57 L  98 


 


 03/14/20 06:19  03/14/20 06:19  03/14/20 06:19  03/14/20 06:19  03/14/20 06:19














Course





- Vital Signs


Vital signs: 





                                        











Temp Pulse Resp BP Pulse Ox


 


 97.9 F   80   18   109/57 L  98 


 


 03/14/20 06:19  03/14/20 06:19  03/14/20 06:19  03/14/20 06:19  03/14/20 06:19














Doctor's Discharge





- Discharge


Referrals: 


KAREN HARVEY MD [Primary Care Provider] - Follow up as needed

## 2020-03-18 NOTE — ER DOCUMENT REPORT
Entered by BEATRIZ CAREY SCRIBE  03/14/20 0806 





Acting as scribe for:ANNA REAVES MD





ED General





- General


Chief Complaint: Hand Swelling


Stated Complaint: SWOLLEN FINGER


Time Seen by Provider: 03/14/20 06:44


Primary Care Provider: 


KAREN HARVEY MD [Primary Care Provider] - Follow up as needed


Information source: Patient


Notes: 





This 69 year old female patient presents to the emergency department today with 

swelling in her right hand. Patient states her hand was swollen yesterday. 

Patient states she was not able to remove her rings on her right hand and they 

were cut off by the nurses this morning. 


TRAVEL OUTSIDE OF THE [place] IN LAST 30 DAYS: No





- Related Data


Allergies/Adverse Reactions: 


                                        





No Known Allergies Allergy (Verified 11/20/19 16:25)


   








Home Medications: amilodipine.  losartan.  low dose asa





Past Medical History





- General


Information source: Patient





- Social History


Smoking Status: Never Smoker


Cigarette use (# per day): No


Family History: Reviewed & Not Pertinent


Patient has suicidal ideation: No


Patient has homicidal ideation: No





- Past Medical History


Cardiac Medical History: Reports: Hx Hypercholesterolemia, Hx Hypertension


Musculoskeletal Medical History: Reports Hx Arthritis - Legs


Past Surgical History: Reports: Hx Appendectomy, Hx Hysterectomy, Hx Orthopedic 

Surgery, Hx Tubal Ligation





- Immunizations


Immunizations up to date: Yes


Hx Diphtheria, Pertussis, Tetanus Vaccination: Yes


Hx Pneumococcal Vaccination: 01/01/16





Review of Systems





- Review of Systems


Constitutional: No symptoms reported


EENT: No symptoms reported


Cardiovascular: No symptoms reported


Respiratory: No symptoms reported


Gastrointestinal: No symptoms reported


Genitourinary: No symptoms reported


Female Genitourinary: No symptoms reported


Musculoskeletal: See HPI


Skin: Other - Swelling in right hand.


Hematologic/Lymphatic: No symptoms reported


Neurological/Psychological: No symptoms reported


-: Yes All other systems reviewed and negative





Physical Exam





- Vital signs


Vitals: 


                                        











Temp Pulse Resp BP Pulse Ox


 


 97.9 F   80   18   109/57 L  98 


 


 03/14/20 06:19  03/14/20 06:19  03/14/20 06:19  03/14/20 06:19  03/14/20 06:19














- General


General appearance: Appears well, Alert





- HEENT


Head: Normocephalic, Atraumatic


Eyes: Normal


Pupils: PERRL





- Respiratory


Respiratory status: No respiratory distress


Chest status: Nontender


Breath sounds: Normal





- Cardiovascular


Rhythm: Regular


Heart sounds: Normal auscultation


Murmur: No





- Abdominal


Inspection: Normal


Distension: No distension


Bowel sounds: Normal


Tenderness: Nontender





- Extremities


General lower extremity: Normal inspection.  No: Edema


Hand: Other - Superficial linear abrasion to the proximal right ring finger. 

Abrasion is only on the dorsal side of the right ring finger amd there is no 

acute bleeding. Movement is normal in right hand and there is soft tissue 

swelling.





- Neurological


Neuro grossly intact: Yes


Cognition: Normal


Orientation: AAOx4





- Psychological


Associated symptoms: Normal affect, Normal mood





- Skin


Skin Temperature: Warm


Skin Moisture: Dry


Skin Color: Normal





Course





- Re-evaluation


Re-evalutation: 





03/14/20 09:06


Patient resting comfortably in no acute distress.  The right ring finger with 

ring present has been removed due to swelling.  Patient does have a swelling of 

the right ring finger mostly proximal from the M PIP joint to the MCP joint 

area.  There is denuded skin superficial abrasion linear on the dorsal side of 

the right ring finger in the area of the proximal phalanx.  No acute bleeding at

this time.  No evidence for deformity other than soft tissue swelling.





- Vital Signs


Vital signs: 


                                        











Temp Pulse Resp BP Pulse Ox


 


 97.8 F   71   16   103/50 L  98 


 


 03/14/20 09:13  03/14/20 09:13  03/14/20 09:13  03/14/20 09:13  03/14/20 09:13














- Diagnostic Test


Radiology reviewed: Image reviewed, Reports reviewed


Radiology results interpreted by me: 





03/14/20 09:07


X-rays show no acute fracture just soft tissue swelling of the right ring 

finger.





Discharge





- Discharge


Clinical Impression: 


 Swelling of finger of right hand, Abrasion of finger of right hand





Condition: Stable


Disposition: HOME, SELF-CARE


Additional Instructions: 


Abrasions





     An abrasion is a scraping injury of the skin.  Some scarring may result.  

The seriousness of an abrasion is not always obvious at first. Hidden tissue 

damage may be present and infection may occur despite proper care.


     Complete healing may take from ten days to as long as a month. The healing 

time depends on the depth of the abrasion, and on the amount of crushing of 

underlying tissues from the injury.


     Keep the wound and dressing clean.  Do not shower or bathe the area until 

okayed by the doctor.  If the dressing gets wet, remove it and blot the wound 

dry, then reapply a clean dressing.  Dressings should be changed every day.  

Sunscreen should be used for six months after the skin is healed.


     If any signs of infection occur (swelling, redness, increasing tenderness, 

red streaks, profuse purulent drainage from the abrasion, tender lumps in the 

armpit or groin above the abrasion, or fever), see the doctor immediately.


Recommend you take ibuprofen as needed for pain or swelling.





Also recommend applying over-the-counter Neosporin ointment and Band-Aid until 

abrasion is healed.





Avoid putting any ring on the right hand until this issue of swelling has 

resolved, and healed skin on the right ring finger.


Referrals: 


KAREN HARVEY MD [Primary Care Provider] - Follow up as needed





I personally performed the services described in the documentation, reviewed and

edited the documentation which was dictated to the scribe in my presence, and it

accurately records my words and actions.

## 2020-07-18 NOTE — RADIOLOGY REPORT (SQ)
EXAM DESCRIPTION:  ANKLE RIGHT COMPLETE



IMAGES COMPLETED DATE/TIME:  7/18/2020 2:18 pm



REASON FOR STUDY:  pain to right foot and ankle



COMPARISON:  None.



NUMBER OF VIEWS:  Three views.



TECHNIQUE:  AP, lateral, and oblique without weight bearing radiographic images acquired of the right
 ankle.



LIMITATIONS:  None.



FINDINGS:  MINERALIZATION: Normal.

BONES: No acute fracture or dislocation. No worrisome bone lesions. No significant osteophytes.

JOINTS: No effusions.

SOFT TISSUES: Generalized soft tissue swelling.

OTHER: No other significant finding.



IMPRESSION:  Soft tissue swelling.  No acute fracture.



TECHNICAL DOCUMENTATION:  JOB ID:  [id number]

 2011 [employer]- All Rights Reserved



Reading location - IP/workstation name: [email or web address]

## 2020-07-18 NOTE — RADIOLOGY REPORT (SQ)
EXAM DESCRIPTION:  FOOT RIGHT COMPLETE



IMAGES COMPLETED DATE/TIME:  7/18/2020 2:18 pm



REASON FOR STUDY:  pain to right foot and ankle



COMPARISON:  None.



NUMBER OF VIEWS:  Three views.



TECHNIQUE:  AP, lateral and oblique  radiographic images acquired of the right foot.



LIMITATIONS:  None.



FINDINGS:  MINERALIZATION: Normal.

BONES: No acute fracture or dislocation.  No worrisome bone lesions.

JOINTS: No effusions.

SOFT TISSUES: No soft tissue swelling.  No foreign body.

OTHER: No other significant finding.



IMPRESSION:  NEGATIVE STUDY OF THE RIGHT FOOT. NO RADIOGRAPHIC EVIDENCE OF ACUTE INJURY.



TECHNICAL DOCUMENTATION:  JOB ID:  [id number]

 2011 [email or web address]- All Rights Reserved



Reading location - IP/workstation name: [email or web address]

## 2020-07-18 NOTE — RADIOLOGY REPORT (SQ)
EXAM DESCRIPTION:  CHEST 2 VIEWS



IMAGES COMPLETED DATE/TIME:  7/18/2020 2:18 pm



REASON FOR STUDY:  pedal edema



COMPARISON:  None.



EXAM PARAMETERS:  NUMBER OF VIEWS: two views

TECHNIQUE: Digital Frontal and Lateral radiographic views of the chest acquired.

RADIATION DOSE: NA

LIMITATIONS: none



FINDINGS:  LUNGS AND PLEURA: No opacities, masses or pneumothorax. No pleural effusion.

MEDIASTINUM AND HILAR STRUCTURES: No masses or contour abnormalities.

HEART AND VASCULAR STRUCTURES: Heart normal size.  No evidence for failure.

BONES: No acute findings.

HARDWARE: None in the chest.

OTHER: No other significant finding.



IMPRESSION:  NO ACUTE RADIOGRAPHIC FINDING IN THE CHEST.



TECHNICAL DOCUMENTATION:  JOB ID:  [id number]

 2011 [employer]- All Rights Reserved



Reading location - IP/workstation name: [email or web address]

## 2020-07-18 NOTE — ER DOCUMENT REPORT
ED Extremity Problem, Lower





- General


Chief Complaint: Pedal Edema


Stated Complaint: RIGHT FOOT SWELLING/PAIN


Time Seen by Provider: 07/18/20 13:28


Primary Care Provider: 


KAREN HARVEY MD [Primary Care Provider] - Follow up in 3-5 days


Mode of Arrival: Wheelchair


Notes: 





Patient is a 69-year-old female who presents to the emergency department with a 

chief complaint of bilateral lower extremity swelling, specifically to her ankle

and her foot.  Patient states that her right ankle has been more painful than 

left.  She has had her symptoms for about 2 days.  Patient is currently taking 

furosemide to help with that, but states that it helps a little bit, but she 

continues to have the pain.  Patient currently takes Premarin.  She denies any 

shortness of breath, difficulty breathing, or any other symptoms.  Patient's 

past medical history includes hypertension, hyperlipidemia, tubal ligation, 

hysterectomy, toe surgery, and rotator cuff surgery.


TRAVEL OUTSIDE OF THE [place] IN LAST 30 DAYS: No





- Related Data


Allergies/Adverse Reactions: 


                                        





No Known Allergies Allergy (Verified 11/20/19 16:25)


   











Past Medical History





- General


Information source: Patient





- Social History


Smoking Status: Unknown if Ever Smoked


Family History: Reviewed & Not Pertinent





- Past Medical History


Cardiac Medical History: Reports: Hx Hypercholesterolemia, Hx Hypertension


   Denies: Hx Coronary Artery Disease, Hx Heart Attack


Pulmonary Medical History: 


   Denies: Hx Asthma, Hx Bronchitis, Hx COPD, Hx Pneumonia


Neurological Medical History: Denies: Hx Cerebrovascular Accident


Endocrine Medical History: Denies: Hx Diabetes Mellitus Type 1, Hx Diabetes 

Mellitus Type 2


Renal/ Medical History: Denies: Hx Peritoneal Dialysis


Musculoskeletal Medical History: Reports Hx Arthritis - Legs


Psychiatric Medical History: 


   Denies: Hx Depression


Past Surgical History: Reports: Hx Appendectomy, Hx Hysterectomy, Hx Orthopedic 

Surgery, Hx Tubal Ligation





- Immunizations


Immunizations up to date: Yes


Hx Diphtheria, Pertussis, Tetanus Vaccination: Yes


Hx Pneumococcal Vaccination: 01/01/16





Review of Systems





- Review of Systems


Notes: 





REVIEW OF SYSTEMS:





CONSTITUTIONAL :    Denies recent illness.  Denies recent unintentional weight 

loss.  Denies fever,  chills, or sweats. 


EENT: Denies eye, ear, throat, or mouth pain, discharge, or symptoms.  Denies 

nasal or sinus congestion.


CARDIOVASCULAR:  Denies chest pain.


RESPIRATORY: Denies shortness of breath, cough, congestion, difficulty 

breathing, or wheezing. 


GASTROINTESTINAL: Denies nausea, vomiting, and diarrhea.  Denies abdominal pain.

 Denies constipation. 


GENITOURINARY:  Denies difficulty urinating, burning, blood in urine, urgency or

frequency.


MUSCULOSKELETAL:  Denies neck and back pain.  See HPI.


SKIN:   See HPI.


HEMATOLOGIC :   Denies easy bruising or bleeding.


LYMPHATIC:  Denies swollen, painful, enlarged glands.


NEUROLOGICAL: Denies no numbness or tingling denies weakness.  Denies headache. 

Denies altered mental status.  Denies alteration in speech.


PSYCHIATRIC:  Denies stress, anxiety, alteration in sleep patterns, or 

depression.





All other systems reviewed and negative.





Physical Exam





- Vital signs


Vitals: 


                                        











Temp Pulse Resp BP Pulse Ox


 


 98.8 F   95   16   119/63   97 


 


 07/18/20 13:28  07/18/20 13:28  07/18/20 13:28  07/18/20 13:28  07/18/20 13:28














- Notes


Notes: 





PHYSICAL EXAMINATION:





GENERAL: Appears well, healthy, well-nourished, no acute distress. 





HEAD:  Normocephalic, atraumatic.





EYES:  PERRL, conjunctiva normal, all extraocular movements intact, sclera 

nonicteric





ENT:  Moist mucous membranes. 





NECK: Supple, no noticeable swelling, redness, rash.  Normal range of motion.





LUNGS: Equal breath sounds bilaterally and clear to auscultation.  No wheezes 

rales or rhonchi.





CARDIOVASCULAR: S1-S2, regular rate, regular rhythm.  Radial pulses 2+, normal.





ABDOMEN: Normoactive bowel sounds.  Soft, nontender,  no guarding, no rebound 

tenderness, and no masses palpated.





EXTREMITIES: 1+ edema noted to bilateral lower extremities.  No cyanosis. 





NEUROLOGICAL: Moves all extremities upon command.  Strength 5/5 in all 

extremities. 





PSYCH: Normal mood, normal affect.





SKIN: Warm, dry.  Small amount of erythema and warmth noted to right ankle.  No 

areas of fluctuation noted.





Course





- Re-evaluation


Re-evalutation: 





07/18/20 18:11


Radiology tech reported that the patient's venous Doppler study is negative for 

any DVT.  I suspect the patient has a component of cellulitis with her swelling 

of her right foot, as she is tender to touch at the area.  Will start the patie

nt on Keflex.  She will follow-up with her primary care provider.  Follow-up 

precautions were given.  Verbal discharge instructions were given to the 

patient.  They verbalized understanding.  They are stable for discharge.











- Vital Signs


Vital signs: 


                                        











Temp Pulse Resp BP Pulse Ox


 


 98.8 F   72   17   136/72 H  100 


 


 07/18/20 13:28  07/18/20 18:31  07/18/20 18:31  07/18/20 18:31  07/18/20 18:31














- Laboratory


Result Diagrams: 


                                 07/18/20 13:58





                                 07/18/20 13:58


Laboratory results interpreted by me: 


                                        











  07/18/20 07/18/20





  13:58 13:58


 


Hgb  11.5 L 


 


Hct  33.9 L 


 


RDW  16.1 H 


 


Eos % (Auto)  6.1 H 


 


Sodium   135.7 L


 


Potassium   3.4 L


 


Carbon Dioxide   31 H














Discharge





- Discharge


Clinical Impression: 


 Swelling of both lower extremities





Cellulitis


Qualifiers:


 Site of cellulitis: extremity Site of cellulitis of extremity: lower extremity 

Laterality: right Qualified Code(s): L03.115 - Cellulitis of right lower limb





Condition: Stable


Disposition: HOME, SELF-CARE


Additional Instructions: 


The rash is likely due to infection of your skin.  You need to take the 

antibiotics as prescribed.  Do not stop even if the rash goes away until you 

have completed all the antibiotics.  The area of redness was traced out here in 

the emergency department with a marking pen.  You need to return to emergency 

department if the redness spreads outside of this area by more than 2 cm in any 

direction.  You should also return if you develop fevers with temperature 

greater than 101, persistent vomiting, worsening pain, or have any other 

symptoms that are concerning to you.


Prescriptions: 


Cephalexin Monohydrate [Keflex 500 mg Capsule] 500 mg PO Q6H 7 Days #28 capsule


Referrals: 


KAREN HARVEY MD [Primary Care Provider] - Follow up in 3-5 days

## 2020-07-18 NOTE — ER DOCUMENT REPORT
ED Medical Screen (RME)





- General


Chief Complaint: Pedal Edema


Stated Complaint: RIGHT FOOT SWELLING/PAIN


Time Seen by Provider: 07/18/20 13:28


Primary Care Provider: 


KAREN HARVEY MD [Primary Care Provider] - Follow up as needed


Mode of Arrival: Wheelchair


Information source: Patient


Notes: 





69-year-old female presents to ED for bilateral pedal edema.  Her right ankle 

and foot have been painful for the last 2 days the edema has been for 2 days.  

The edema goes up past both knees.  She is alert oriented respirations regular 

nonlabored speaking in full sentences.  She states she has not been very active 

except for taking care of her  who is in the hospital.  She does have a 

history of high blood pressure cholesterol bilateral tubal ligation hysterectomy

right shoulder rotator cuff repair and right toe surgery.  She is on Premarin.  

I have ordered blood chest x-ray and venous Dopplers she is at risk for clots 

due to her age and her use of Premarin.

















I have greeted and performed a rapid initial assessment of this patient.  A 

comprehensive ED assessment and evaluation of the patient, analysis of test 

results and completion of medical decision making process will be conducted by 

an additional ED providers.


TRAVEL OUTSIDE OF THE [place] IN LAST 30 DAYS: No





- Related Data


Allergies/Adverse Reactions: 


                                        





No Known Allergies Allergy (Verified 11/20/19 16:25)


   











Past Medical History





- Past Medical History


Cardiac Medical History: Reports: Hx Hypercholesterolemia, Hx Hypertension


   Denies: Hx Coronary Artery Disease, Hx Heart Attack


Pulmonary Medical History: 


   Denies: Hx Asthma, Hx Bronchitis, Hx COPD, Hx Pneumonia


Neurological Medical History: Denies: Hx Cerebrovascular Accident


Endocrine Medical History: Denies: Hx Diabetes Mellitus Type 1, Hx Diabetes 

Mellitus Type 2


Renal/ Medical History: Denies: Hx Peritoneal Dialysis


Musculoskeltal Medical History: Reports Hx Arthritis - Legs


Psychiatric Medical History: 


   Denies: Hx Depression


Past Surgical History: Reports: Hx Appendectomy, Hx Hysterectomy, Hx Orthopedic 

Surgery, Hx Tubal Ligation





- Immunizations


Immunizations up to date: Yes


Hx Diphtheria, Pertussis, Tetanus Vaccination: Yes





Physical Exam





- Vital signs


Vitals: 





                                        











Temp Pulse Resp BP Pulse Ox


 


 98.8 F   95   16   119/63   97 


 


 07/18/20 13:28  07/18/20 13:28  07/18/20 13:28  07/18/20 13:28  07/18/20 13:28














Course





- Vital Signs


Vital signs: 





                                        











Temp Pulse Resp BP Pulse Ox


 


 98.8 F   95   16   119/63   97 


 


 07/18/20 13:28  07/18/20 13:28  07/18/20 13:28  07/18/20 13:28  07/18/20 13:28














Doctor's Discharge





- Discharge


Referrals: 


KAREN HARVEY MD [Primary Care Provider] - Follow up as needed

## 2020-07-18 NOTE — RADIOLOGY REPORT (SQ)
EXAM DESCRIPTION:  VENOUS BILATERAL LOWER



IMAGES COMPLETED DATE/TIME:  7/18/2020 6:12 pm



REASON FOR STUDY:  bilateral edema  on estrogen

Bilateral lower extremity edema.



COMPARISON:  None.



TECHNIQUE:  Grayscale and color images acquired of both lower extremity venous systems. Selected spec
tral images acquired with additional compression and augmentation maneuvers. Images stored on PACS.



LIMITATIONS:  None.



FINDINGS:  RIGHT LEG

COMMON FEMORAL AND FEMORAL: Normal phasicity, compression and augmentation. No visualized echogenic m
aterial on gray scale. No defects on color images.

POPLITEAL: Normal compression and augmentation. No visualized echogenic material on gray scale. No de
fects on color images.

CALF VESSELS: Normal compression and augmentation. No visualized echogenic material on gray scale. No
 defects on color image.

GSV AND SSV: Normal compression. No visualized echogenic material on gray scale. No defects on color 
images.

ANY DEEP VENOUS INSUFFICIENCY: Not evaluated.

ANY EVIDENCE OF POPLITEAL CYST: No.

LEFT LEG

COMMON FEMORAL AND FEMORAL: Normal phasicity, compression and augmentation. No visualized echogenic m
aterial on gray scale. No defects on color images.

POPLITEAL: Normal compression and augmentation. No visualized echogenic material on gray scale. No de
fects on color images.

CALF VESSELS: Normal compression and augmentation. No visualized echogenic material on gray scale. No
 defects on color images.

GSV AND SSV: Normal compression. No visualized echogenic material on gray scale. No defects on color 
images.

ANY DEEP VENOUS INSUFFICIENCY: Not evaluated.

ANY EVIDENCE POPLITEAL CYST: No.



IMPRESSION:  NO EVIDENCE FOR DVT OR SVT IN EITHER LEG.



TECHNICAL DOCUMENTATION:  JOB ID:  [id number]

[id number]

 2011 [employer]- All Rights Reserved



Reading location - IP/workstation name: [email or web address]